# Patient Record
Sex: MALE | Race: WHITE | Employment: OTHER | URBAN - METROPOLITAN AREA
[De-identification: names, ages, dates, MRNs, and addresses within clinical notes are randomized per-mention and may not be internally consistent; named-entity substitution may affect disease eponyms.]

---

## 2017-04-10 ENCOUNTER — TELEPHONE (OUTPATIENT)
Dept: CARDIOLOGY | Facility: CLINIC | Age: 82
End: 2017-04-10

## 2017-04-10 NOTE — TELEPHONE ENCOUNTER
Daughter Zo calling to let us know that her dad had a UTI and a scrotal abscess that had to be debrided and eventually had to have Left testicle and scrotum removed. He is at [facility] and will be there for another two weeks for antibiotic therapy. Daughter wanted to let us know because pt was worried about his watchman appt on 04/19/17 that he will not be able to attend. Daughter stated that it was possible MRSA that he has. [facility] EDINSON Buchanan can you call daughter [phone] and reschedule.     Thanks